# Patient Record
Sex: MALE | NOT HISPANIC OR LATINO | Employment: UNEMPLOYED | URBAN - METROPOLITAN AREA
[De-identification: names, ages, dates, MRNs, and addresses within clinical notes are randomized per-mention and may not be internally consistent; named-entity substitution may affect disease eponyms.]

---

## 2023-02-21 ENCOUNTER — TELEPHONE (OUTPATIENT)
Dept: OPHTHALMOLOGY | Facility: CLINIC | Age: 33
End: 2023-02-21
Payer: COMMERCIAL

## 2023-02-28 ENCOUNTER — TELEPHONE (OUTPATIENT)
Dept: OPTOMETRY | Facility: CLINIC | Age: 33
End: 2023-02-28

## 2023-02-28 ENCOUNTER — OFFICE VISIT (OUTPATIENT)
Dept: OPTOMETRY | Facility: CLINIC | Age: 33
End: 2023-02-28
Payer: COMMERCIAL

## 2023-02-28 DIAGNOSIS — H57.11 EYE PAIN, RIGHT: Primary | ICD-10-CM

## 2023-02-28 DIAGNOSIS — H52.13 MYOPIA, BILATERAL: ICD-10-CM

## 2023-02-28 DIAGNOSIS — H52.223 REGULAR ASTIGMATISM OF BOTH EYES: ICD-10-CM

## 2023-02-28 PROCEDURE — 92004 COMPRE OPH EXAM NEW PT 1/>: CPT | Performed by: OPTOMETRIST

## 2023-02-28 PROCEDURE — 92015 DETERMINE REFRACTIVE STATE: CPT | Performed by: OPTOMETRIST

## 2023-02-28 RX ORDER — PREDNISOLONE ACETATE 10 MG/ML
1-2 SUSPENSION/ DROPS OPHTHALMIC
Qty: 10 ML | Refills: 1 | Status: SHIPPED | OUTPATIENT
Start: 2023-02-28

## 2023-02-28 ASSESSMENT — VISUAL ACUITY
OS_SC: 20/30
OS_SC: 20/25
OD_SC: 20/30
METHOD: SNELLEN - LINEAR
OD_SC: 20/25
OS_SC+: -1

## 2023-02-28 ASSESSMENT — SLIT LAMP EXAM - LIDS
COMMENTS: NORMAL
COMMENTS: NORMAL

## 2023-02-28 ASSESSMENT — REFRACTION_MANIFEST
OS_AXIS: 156
OS_SPHERE: -1.50
OS_AXIS: 165
OS_CYLINDER: +1.00
OD_AXIS: 011
OD_AXIS: 030
OD_CYLINDER: +0.50
OS_SPHERE: -1.25
OD_SPHERE: -1.00
OD_SPHERE: -1.25
OS_CYLINDER: +1.00
OD_CYLINDER: +0.75
METHOD_AUTOREFRACTION: 1

## 2023-02-28 ASSESSMENT — REFRACTION_WEARINGRX
OS_SPHERE: -1.25
OS_AXIS: 165
OS_CYLINDER: +1.00
OD_CYLINDER: +0.50
SPECS_TYPE: SVL
OD_SPHERE: -1.25
OD_AXIS: 030

## 2023-02-28 ASSESSMENT — KERATOMETRY
OD_K1POWER_DIOPTERS: 45.75
OS_AXISANGLE_DEGREES: 124
OD_AXISANGLE_DEGREES: 30
OS_K1POWER_DIOPTERS: 46.25
OD_K2POWER_DIOPTERS: 46.25
OD_AXISANGLE2_DEGREES: 120
OS_AXISANGLE2_DEGREES: 34
OS_K2POWER_DIOPTERS: 46.75

## 2023-02-28 ASSESSMENT — CONF VISUAL FIELD
OS_NORMAL: 1
OS_INFERIOR_TEMPORAL_RESTRICTION: 0
OD_INFERIOR_TEMPORAL_RESTRICTION: 0
OD_SUPERIOR_NASAL_RESTRICTION: 0
OD_INFERIOR_NASAL_RESTRICTION: 0
OD_SUPERIOR_TEMPORAL_RESTRICTION: 0
OS_SUPERIOR_NASAL_RESTRICTION: 0
OS_SUPERIOR_TEMPORAL_RESTRICTION: 0
OD_NORMAL: 1
OS_INFERIOR_NASAL_RESTRICTION: 0

## 2023-02-28 ASSESSMENT — EXTERNAL EXAM - RIGHT EYE: OD_EXAM: NORMAL

## 2023-02-28 ASSESSMENT — EXTERNAL EXAM - LEFT EYE: OS_EXAM: NORMAL

## 2023-02-28 ASSESSMENT — TONOMETRY
IOP_METHOD: APPLANATION
OD_IOP_MMHG: 16
OS_IOP_MMHG: 14

## 2023-02-28 ASSESSMENT — CUP TO DISC RATIO
OS_RATIO: 0.3
OD_RATIO: 0.3

## 2023-02-28 NOTE — TELEPHONE ENCOUNTER
[facility] Call Center    Phone Message    May a detailed message be left on voicemail: yes     Reason for Call: Other: Pt was in for a dilated eye exam this morning. He is wondering how long he should expect his eyes to be dilated for. He also says that his eyes are unequally dilated and wondering if this is normal. Please call pt to discuss. Thank you.     Action Taken: Message routed to:  Other: [facility]    Travel Screening: Not Applicable

## 2023-02-28 NOTE — PROGRESS NOTES
Chief Complaint   Patient presents with     Annual Eye Exam         Last Eye Exam: 2019  Dilated Previously: Yes    What are you currently using to see?  Has glasses but does not wear them.        Distance Vision Acuity: Noticed gradual change in both eyes    Near Vision Acuity: Satisfied with vision while reading and using computer unaided    Eye Comfort: dry, sore and blood shot    For the last month, patient has been experiencing pain and blurred vision in the right eye. Has had headaches accompanied with this and has tried taking Tylenol to which there was no improvement.     Do you use eye drops? 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       OBJECTIVE: See Ophthalmology exam    ASSESSMENT:    ICD-10-CM    1. Eye pain, right - Right Eye  H57.11 EYE EXAM (SIMPLE-NONBILLABLE)     prednisoLONE acetate (PRED FORTE) 1 % ophthalmic suspension      2. Myopia, bilateral - Both Eyes  H52.13 REFRACTION     EYE EXAM (SIMPLE-NONBILLABLE)      3. Regular astigmatism of both eyes - Both Eyes  H52.223 REFRACTION     EYE EXAM (SIMPLE-NONBILLABLE)          PLAN:     Patient Instructions   ASSESSMENT:  A steroid eye drop has been prescribed to manage the acute pain felt in the last 2-4 weeks in the right eye resulting in intermittent blurry vision at near, pain with eye movement without history of trauma or history of an auto-immune diagnosis.    Instill one drop in the right eye every 2 hours while awake.    RTC 2-3 weeks for an eye pressure check since steroids can elevate pressures in some. RTC PRN IF SYMPTOMS WORSEN    The affects of the dilating drops last for 4- 6 hours.  You will be more sensitive to light and vision will be blurry up close.  Do not drive if you do not feel comfortable.  Mydriatic sunglasses were given if needed.    The right eye was dilated with a longer acting eye drop to reduce the  pain.    Alicja Douglas O.D.  [address] [id number] [phone]

## 2023-02-28 NOTE — PATIENT INSTRUCTIONS
ASSESSMENT:  A steroid eye drop has been prescribed to manage the acute pain felt in the last 2-4 weeks in the right eye resulting in intermittent blurry vision at near, pain with eye movement without history of trauma or history of an auto-immune diagnosis.    Instill one drop in the right eye every 2 hours while awake.    RTC 2-3 weeks for an eye pressure check since steroids can elevate pressures in some. RTC PRN IF SYMPTOMS WORSEN    The affects of the dilating drops last for 4- 6 hours.  You will be more sensitive to light and vision will be blurry up close.  Do not drive if you do not feel comfortable.  Mydriatic sunglasses were given if needed.    The right eye was dilated with a longer acting eye drop to reduce the pain.    Alicja Douglas O.D.  [address] [address] [phone]

## 2023-02-28 NOTE — LETTER
2/28/2023         RE: Nelson Hooker  [address] [address]        Dear Colleague,    Thank you for referring your patient, Nelson Hooker, to the [facility]. Please see a copy of my visit note below.    Chief Complaint   Patient presents with     Annual Eye Exam         Last Eye Exam: 2019  Dilated Previously: Yes    What are you currently using to see?  Has glasses but does not wear them.        Distance Vision Acuity: Noticed gradual change in both eyes    Near Vision Acuity: Satisfied with vision while reading and using computer unaided    Eye Comfort: dry, sore and blood shot    For the last month, patient has been experiencing pain and blurred vision in the right eye. Has had headaches accompanied with this and has tried taking Tylenol to which there was no improvement.     Do you use eye drops? 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       OBJECTIVE: See Ophthalmology exam    ASSESSMENT:    ICD-10-CM    1. Eye pain, right - Right Eye  H57.11 EYE EXAM (SIMPLE-NONBILLABLE)     prednisoLONE acetate (PRED FORTE) 1 % ophthalmic suspension      2. Myopia, bilateral - Both Eyes  H52.13 REFRACTION     EYE EXAM (SIMPLE-NONBILLABLE)      3. Regular astigmatism of both eyes - Both Eyes  H52.223 REFRACTION     EYE EXAM (SIMPLE-NONBILLABLE)          PLAN:     Patient Instructions   ASSESSMENT:  A steroid eye drop has been prescribed to manage the acute pain felt in the last 2-4 weeks in the right eye resulting in intermittent blurry vision at near, pain with eye movement without history of trauma or history of an auto-immune diagnosis.    Instill one drop in the right eye every 2 hours while awake.    RTC 2-3 weeks for an eye pressure check since steroids can elevate pressures in some. RTC PRN IF SYMPTOMS WORSEN    The affects of the dilating  drops last for 4- 6 hours.  You will be more sensitive to light and vision will be blurry up close.  Do not drive if you do not feel comfortable.  Mydriatic sunglasses were given if needed.    The right eye was dilated with a longer acting eye drop to reduce the pain.    Alicja Douglas O.D.  [address] [address]    [phone]           Again, thank you for allowing me to participate in the care of your patient.        Sincerely,        Alicja Douglas OD

## 2023-03-03 ENCOUNTER — TELEPHONE (OUTPATIENT)
Dept: OPTOMETRY | Facility: CLINIC | Age: 33
End: 2023-03-03
Payer: COMMERCIAL

## 2023-03-03 NOTE — TELEPHONE ENCOUNTER
[facility] Call Center    Phone Message    May a detailed message be left on voicemail: yes     Reason for Call: Other: Pt has several questions about prednisoLONE including how long he needs to take it for. Please give him a call back to clarify. Thank you!     Action Taken: Message routed to:  Other: [facility]    Travel Screening: Not Applicable

## 2023-03-03 NOTE — TELEPHONE ENCOUNTER
Spoke to patient about vision, eye drop, and continuing pain concerns. Instructed patient he should be doing eye drops per Dr. Helm's instructions (1 drop every 2 hours when he is awake), pt was confused because the bottle had different instructions.     He also mentioned his vision in his right eye has not cleared out since having his exam done and being dilated. He was told if this hadn't changed, he should call us within 48 hours. I offered patient an earlier appointment with Dr. Mckeon or Dr. Matta next week for his pressure check and also to check his right eye. Pt agreed. I also advised patient to continue eye drops as instructed until his appointment 03/08/23. Pt understood.     Joanna Shah on 3/3/2023 at 11:14 AM

## 2023-03-08 ENCOUNTER — OFFICE VISIT (OUTPATIENT)
Dept: OPTOMETRY | Facility: CLINIC | Age: 33
End: 2023-03-08
Payer: COMMERCIAL

## 2023-03-08 DIAGNOSIS — H01.02B SQUAMOUS BLEPHARITIS OF UPPER AND LOWER EYELIDS OF BOTH EYES: ICD-10-CM

## 2023-03-08 DIAGNOSIS — H57.11 EYE PAIN, RIGHT: Primary | ICD-10-CM

## 2023-03-08 DIAGNOSIS — H01.02A SQUAMOUS BLEPHARITIS OF UPPER AND LOWER EYELIDS OF BOTH EYES: ICD-10-CM

## 2023-03-08 PROCEDURE — 99213 OFFICE O/P EST LOW 20 MIN: CPT | Performed by: OPTOMETRIST

## 2023-03-08 ASSESSMENT — REFRACTION_MANIFEST
OD_SPHERE: -1.25
OD_CYLINDER: +0.50
OS_CYLINDER: +1.00
OS_AXIS: 165
OD_AXIS: 030
OS_SPHERE: -1.50

## 2023-03-08 ASSESSMENT — VISUAL ACUITY
OD_SC+: -1
METHOD: SNELLEN - LINEAR
OS_SC: 20/20
OD_SC: 20/30
OS_SC+: -2

## 2023-03-08 ASSESSMENT — REFRACTION_WEARINGRX
OD_SPHERE: -1.25
OS_AXIS: 165
OS_SPHERE: -1.50
OD_AXIS: 030
OD_CYLINDER: +0.50
OS_CYLINDER: +1.00

## 2023-03-08 ASSESSMENT — EXTERNAL EXAM - RIGHT EYE: OD_EXAM: NORMAL

## 2023-03-08 ASSESSMENT — SLIT LAMP EXAM - LIDS
COMMENTS: BLEPHARITIS
COMMENTS: BLEPHARITIS

## 2023-03-08 ASSESSMENT — TONOMETRY
OD_IOP_MMHG: 17
IOP_METHOD: TONOPEN
OS_IOP_MMHG: 18

## 2023-03-08 ASSESSMENT — EXTERNAL EXAM - LEFT EYE: OS_EXAM: NORMAL

## 2023-03-08 ASSESSMENT — CUP TO DISC RATIO
OS_RATIO: 0.3
OD_RATIO: 0.3

## 2023-03-08 NOTE — PATIENT INSTRUCTIONS
Ok to discontinue pred forte.      Heat to the eyes daily for 10-15 minutes nightly with warm washcloth or reusable gel masks from the pharmacy or  [employer] heat masks can be purchased at [employer].    Ocusoft Hypochlor- spray solution onto cotton pad.  Close eyes and gently apply to eyelids and eyelashes using side to side motion.  Use morning and evening.    Artificial tears- 1 drop both eyes 2-4 x daily.    If eye pain right eye progresses then start Pred Forte again- 1 drop both eyes 4 x day for 1 week and schedule follow up to be seen and discuss taper.    Recommend wearing glasses more consistently to help with blurred vision.    Recommend annual eye exams.    Pratik Mckeon, OD

## 2023-03-08 NOTE — PROGRESS NOTES
Chief Complaint   Patient presents with     RECHECK EYE PRESSURE     Od eye IOP check and discussion on drops        Patient was seen 2/28/2023 for comprehensive eye exam and right eye eye pain and blurred vision.  Dr. Helm prescribed Pred forte- to be used every 2 hours.  Patient used for 48 hours and then read the side affects of glaucoma and stop taking the medication.  He called the clinic and talked to [facility] who recommend that he restart drops and schedule to be seen.  Patient stopped taking pred forte on the evening of 3-2-2023. He didn't feel the drops helped.  Right eye feels ok but blurrier than left eye.  He has glasses which he does not wear.  They are very similar to prescription prescribed 2/28/2023.  He had originally noticed more glare right eye and blurred vision right eye watching TV which prompted him to come in.    Medical, surgical and family histories reviewed and updated 3/8/2023.       OBJECTIVE: See Ophthalmology exam    ASSESSMENT:    ICD-10-CM    1. Eye pain, right - Right Eye  H57.11       2. Squamous blepharitis of upper and lower eyelids of both eyes  H01.02A     H01.02B          PLAN:     Patient Instructions   Ok to discontinue pred forte.      Heat to the eyes daily for 10-15 minutes nightly with warm washcloth or reusable gel masks from the pharmacy or  [employer] heat masks can be purchased at [employer].    Ocusoft Hypochlor- spray solution onto cotton pad.  Close eyes and gently apply to eyelids and eyelashes using side to side motion.  Use morning and evening.    Artificial tears- 1 drop both eyes 2-4 x daily.    If eye pain right eye progresses then start Pred Forte again- 1 drop both eyes 4 x day for 1 week and schedule follow up to be seen and discuss taper.    Recommend wearing glasses more consistently to help with blurred vision.    Recommend annual eye exams.    Pratik Mckeon, OD

## 2023-03-08 NOTE — LETTER
3/8/2023         RE: Nelson Hooker  [address] [facility] [address]        Dear Colleague,    Thank you for referring your patient, Nelson Hooker, to the [facility]. Please see a copy of my visit note below.    Chief Complaint   Patient presents with     RECHECK EYE PRESSURE     Od eye IOP check and discussion on drops        Patient was seen 2/28/2023 for comprehensive eye exam and right eye eye pain and blurred vision.  Dr. Helm prescribed Pred forte- to be used every 2 hours.  Patient used for 48 hours and then read the side affects of glaucoma and stop taking the medication.  He called the clinic and talked to [facility] who recommend that he restart drops and schedule to be seen.  Patient stopped taking pred forte on the evening of 3-2-2023. He didn't feel the drops helped.  Right eye feels ok but blurrier than left eye.  He has glasses which he does not wear.  They are very similar to prescription prescribed 2/28/2023.  He had originally noticed more glare right eye and blurred vision right eye watching TV which prompted him to come in.    Medical, surgical and family histories reviewed and updated 3/8/2023.       OBJECTIVE: See Ophthalmology exam    ASSESSMENT:    ICD-10-CM    1. Eye pain, right - Right Eye  H57.11       2. Squamous blepharitis of upper and lower eyelids of both eyes  H01.02A     H01.02B          PLAN:     Patient Instructions   Ok to discontinue pred forte.      Heat to the eyes daily for 10-15 minutes nightly with warm washcloth or reusable gel masks from the pharmacy or  [employer] heat masks can be purchased at [employer].    Ocusoft Hypochlor- spray solution onto cotton pad.  Close eyes and gently apply to eyelids and eyelashes using side to side motion.  Use morning and evening.    Artificial tears- 1 drop both eyes 2-4 x daily.    If eye pain right eye progresses then start Pred Forte again- 1 drop both eyes 4 x day for 1 week and schedule  follow up to be seen and discuss taper.    Recommend wearing glasses more consistently to help with blurred vision.    Recommend annual eye exams.    Pratik Mckeon, OD           Again, thank you for allowing me to participate in the care of your patient.        Sincerely,        Pratik Mckeon, OD